# Patient Record
Sex: MALE | Race: WHITE
[De-identification: names, ages, dates, MRNs, and addresses within clinical notes are randomized per-mention and may not be internally consistent; named-entity substitution may affect disease eponyms.]

---

## 2019-11-01 PROBLEM — Z98.890 S/P PARS PLANA VITRECTOMY (PPV): Noted: 2019-11-01

## 2020-07-10 ENCOUNTER — NEW PATIENT (OUTPATIENT)
Age: 65
End: 2020-07-10

## 2020-07-10 DIAGNOSIS — E11.9: ICD-10-CM

## 2020-07-10 DIAGNOSIS — H25.12: ICD-10-CM

## 2020-07-10 PROCEDURE — 92025 CPTRIZED CORNEAL TOPOGRAPHY: CPT

## 2020-07-10 PROCEDURE — 99204 OFFICE O/P NEW MOD 45 MIN: CPT

## 2020-07-10 ASSESSMENT — TONOMETRY
OD_IOP_MMHG: 18
OS_IOP_MMHG: 19

## 2020-07-10 ASSESSMENT — VISUAL ACUITY
OS_CC: CF 4FT
OD_CC: J1
OD_CC: 20/20
OS_PH: 20/150
OS_CC: J16
OD_GLARE: 20/20

## 2020-07-10 ASSESSMENT — KERATOMETRY
OD_AXISANGLE_DEGREES: 167
OD_AXISANGLE2_DEGREES: 77
OS_AXISANGLE2_DEGREES: 100
OS_K2POWER_DIOPTERS: 45
OD_K2POWER_DIOPTERS: 44.75
OS_K1POWER_DIOPTERS: 44.25
OD_K1POWER_DIOPTERS: 44
OS_AXISANGLE_DEGREES: 10

## 2020-08-06 ENCOUNTER — DIAGNOSTICS ONLY (OUTPATIENT)
Age: 65
End: 2020-08-06

## 2020-08-06 DIAGNOSIS — E11.9: ICD-10-CM

## 2020-08-06 DIAGNOSIS — H25.12: ICD-10-CM

## 2020-08-06 PROCEDURE — 92136 OPHTHALMIC BIOMETRY: CPT

## 2020-08-06 PROCEDURE — 92025 CPTRIZED CORNEAL TOPOGRAPHY: CPT

## 2020-08-06 PROCEDURE — 92134 CPTRZ OPH DX IMG PST SGM RTA: CPT

## 2020-08-06 ASSESSMENT — KERATOMETRY
OD_AXISANGLE_DEGREES: 167
OS_K2POWER_DIOPTERS: 45
OD_K1POWER_DIOPTERS: 44
OD_K2POWER_DIOPTERS: 44.75
OD_AXISANGLE2_DEGREES: 77
OS_AXISANGLE_DEGREES: 10
OS_AXISANGLE2_DEGREES: 100
OS_K1POWER_DIOPTERS: 44.25

## 2020-08-18 ENCOUNTER — SURGERY/PROCEDURE (OUTPATIENT)
Age: 65
End: 2020-08-18

## 2020-08-18 DIAGNOSIS — H25.12: ICD-10-CM

## 2020-08-18 DIAGNOSIS — H52.222: ICD-10-CM

## 2020-08-18 PROCEDURE — 66984 XCAPSL CTRC RMVL W/O ECP: CPT

## 2020-08-18 ASSESSMENT — KERATOMETRY
OS_AXISANGLE_DEGREES: 10
OS_K2POWER_DIOPTERS: 45
OS_AXISANGLE2_DEGREES: 100
OD_K1POWER_DIOPTERS: 44
OD_AXISANGLE_DEGREES: 167
OD_AXISANGLE2_DEGREES: 77
OD_K2POWER_DIOPTERS: 44.75
OS_K1POWER_DIOPTERS: 44.25

## 2020-08-20 ENCOUNTER — POST-OP CHECK (OUTPATIENT)
Age: 65
End: 2020-08-20

## 2020-08-20 DIAGNOSIS — Z96.1: ICD-10-CM

## 2020-08-20 PROCEDURE — 99024 POSTOP FOLLOW-UP VISIT: CPT

## 2020-08-20 ASSESSMENT — TONOMETRY: OS_IOP_MMHG: 10

## 2020-08-20 ASSESSMENT — VISUAL ACUITY: OS_SC: 20/150

## 2020-08-27 ENCOUNTER — POST-OP CHECK (OUTPATIENT)
Age: 65
End: 2020-08-27

## 2020-08-27 DIAGNOSIS — Z96.1: ICD-10-CM

## 2020-08-27 PROCEDURE — 99024 POSTOP FOLLOW-UP VISIT: CPT

## 2020-08-27 ASSESSMENT — VISUAL ACUITY
OS_SC: 20/200
OS_SC: J1+

## 2020-08-27 ASSESSMENT — KERATOMETRY
OS_AXISANGLE_DEGREES: 23
OS_AXISANGLE2_DEGREES: 113
OS_K1POWER_DIOPTERS: 44.25
OS_K2POWER_DIOPTERS: 44.75

## 2020-08-27 ASSESSMENT — TONOMETRY: OS_IOP_MMHG: 16

## 2020-09-18 ENCOUNTER — POST-OP CHECK (OUTPATIENT)
Age: 65
End: 2020-09-18

## 2020-09-18 DIAGNOSIS — Z96.1: ICD-10-CM

## 2020-09-18 PROCEDURE — 99024 POSTOP FOLLOW-UP VISIT: CPT

## 2020-09-18 ASSESSMENT — KERATOMETRY
OS_K1POWER_DIOPTERS: 44.5
OS_K2POWER_DIOPTERS: 45
OS_AXISANGLE_DEGREES: 177
OS_AXISANGLE2_DEGREES: 113
OS_AXISANGLE_DEGREES: 23
OS_K1POWER_DIOPTERS: 44.25
OS_AXISANGLE2_DEGREES: 87
OS_K2POWER_DIOPTERS: 44.75

## 2020-09-18 ASSESSMENT — TONOMETRY: OS_IOP_MMHG: 13

## 2020-09-18 ASSESSMENT — VISUAL ACUITY
OS_SC: 20/150
OS_SC: J1+
OS_PH: 20/30

## 2021-12-14 ENCOUNTER — PREPPED CHART (OUTPATIENT)
Dept: URBAN - METROPOLITAN AREA CLINIC 101 | Facility: CLINIC | Age: 66
End: 2021-12-14

## 2021-12-14 DIAGNOSIS — H43.811: ICD-10-CM

## 2021-12-14 DIAGNOSIS — H33.322: ICD-10-CM

## 2021-12-14 DIAGNOSIS — H35.372: ICD-10-CM

## 2021-12-14 DIAGNOSIS — E11.9: ICD-10-CM

## 2021-12-14 PROBLEM — H25.11 NS CATARACT: Noted: 2021-12-14 | Resolved: 2021-12-14

## 2021-12-14 PROBLEM — H25.11 NS CATARACT: Noted: 2021-12-14

## 2021-12-14 PROCEDURE — 92201 OPSCPY EXTND RTA DRAW UNI/BI: CPT

## 2021-12-14 PROCEDURE — 92014 COMPRE OPH EXAM EST PT 1/>: CPT

## 2021-12-14 PROCEDURE — 92134 CPTRZ OPH DX IMG PST SGM RTA: CPT

## 2022-05-26 ASSESSMENT — VISUAL ACUITY
OS_CC: 20/20
OD_CC: 20/20-2

## 2022-05-26 ASSESSMENT — TONOMETRY
OS_IOP_MMHG: 16
OD_IOP_MMHG: 18

## 2022-06-17 ENCOUNTER — FOLLOW UP (OUTPATIENT)
Dept: URBAN - METROPOLITAN AREA CLINIC 101 | Facility: CLINIC | Age: 67
End: 2022-06-17

## 2022-06-17 DIAGNOSIS — H35.372: ICD-10-CM

## 2022-06-17 DIAGNOSIS — H33.322: ICD-10-CM

## 2022-06-17 DIAGNOSIS — E11.9: ICD-10-CM

## 2022-06-17 DIAGNOSIS — H43.811: ICD-10-CM

## 2022-06-17 PROCEDURE — 99214 OFFICE O/P EST MOD 30 MIN: CPT

## 2022-06-17 PROCEDURE — 92201 OPSCPY EXTND RTA DRAW UNI/BI: CPT

## 2022-06-17 PROCEDURE — 92134 CPTRZ OPH DX IMG PST SGM RTA: CPT

## 2022-06-17 ASSESSMENT — TONOMETRY
OD_IOP_MMHG: 17
OS_IOP_MMHG: 18

## 2022-06-17 ASSESSMENT — VISUAL ACUITY
OS_CC: 20/25-1
OD_CC: 20/20-2

## 2022-12-02 ENCOUNTER — FOLLOW UP (OUTPATIENT)
Dept: URBAN - METROPOLITAN AREA CLINIC 101 | Facility: CLINIC | Age: 67
End: 2022-12-02

## 2022-12-02 DIAGNOSIS — H25.11: ICD-10-CM

## 2022-12-02 DIAGNOSIS — H35.372: ICD-10-CM

## 2022-12-02 DIAGNOSIS — H33.322: ICD-10-CM

## 2022-12-02 DIAGNOSIS — H43.811: ICD-10-CM

## 2022-12-02 DIAGNOSIS — E11.9: ICD-10-CM

## 2022-12-02 PROCEDURE — 92201 OPSCPY EXTND RTA DRAW UNI/BI: CPT

## 2022-12-02 PROCEDURE — 92134 CPTRZ OPH DX IMG PST SGM RTA: CPT

## 2022-12-02 PROCEDURE — 92014 COMPRE OPH EXAM EST PT 1/>: CPT

## 2022-12-02 ASSESSMENT — VISUAL ACUITY
OD_CC: 20/20-2
OS_CC: 20/20

## 2022-12-02 ASSESSMENT — TONOMETRY
OD_IOP_MMHG: 18
OS_IOP_MMHG: 18

## 2023-06-09 ENCOUNTER — FOLLOW UP (OUTPATIENT)
Dept: URBAN - METROPOLITAN AREA CLINIC 101 | Facility: CLINIC | Age: 68
End: 2023-06-09

## 2023-06-09 DIAGNOSIS — Z96.1: ICD-10-CM

## 2023-06-09 DIAGNOSIS — H35.372: ICD-10-CM

## 2023-06-09 DIAGNOSIS — H43.811: ICD-10-CM

## 2023-06-09 DIAGNOSIS — H25.11: ICD-10-CM

## 2023-06-09 DIAGNOSIS — H33.322: ICD-10-CM

## 2023-06-09 DIAGNOSIS — E11.9: ICD-10-CM

## 2023-06-09 PROCEDURE — 92014 COMPRE OPH EXAM EST PT 1/>: CPT

## 2023-06-09 PROCEDURE — 92134 CPTRZ OPH DX IMG PST SGM RTA: CPT

## 2023-06-09 PROCEDURE — 92201 OPSCPY EXTND RTA DRAW UNI/BI: CPT

## 2023-06-09 ASSESSMENT — TONOMETRY
OD_IOP_MMHG: 19
OS_IOP_MMHG: 17

## 2023-06-09 ASSESSMENT — VISUAL ACUITY
OS_CC: 20/20
OD_PH: 20/20
OD_CC: 20/25-1

## 2023-09-11 ENCOUNTER — APPOINTMENT (OUTPATIENT)
Age: 68
Setting detail: DERMATOLOGY
End: 2023-09-11

## 2023-09-11 DIAGNOSIS — L82.1 OTHER SEBORRHEIC KERATOSIS: ICD-10-CM

## 2023-09-11 DIAGNOSIS — L81.4 OTHER MELANIN HYPERPIGMENTATION: ICD-10-CM

## 2023-09-11 DIAGNOSIS — Z71.89 OTHER SPECIFIED COUNSELING: ICD-10-CM

## 2023-09-11 DIAGNOSIS — L57.0 ACTINIC KERATOSIS: ICD-10-CM

## 2023-09-11 DIAGNOSIS — L82.0 INFLAMED SEBORRHEIC KERATOSIS: ICD-10-CM

## 2023-09-11 DIAGNOSIS — D22 MELANOCYTIC NEVI: ICD-10-CM

## 2023-09-11 DIAGNOSIS — D18.0 HEMANGIOMA: ICD-10-CM

## 2023-09-11 DIAGNOSIS — B07.8 OTHER VIRAL WARTS: ICD-10-CM

## 2023-09-11 DIAGNOSIS — L73.8 OTHER SPECIFIED FOLLICULAR DISORDERS: ICD-10-CM

## 2023-09-11 PROBLEM — D22.5 MELANOCYTIC NEVI OF TRUNK: Status: ACTIVE | Noted: 2023-09-11

## 2023-09-11 PROBLEM — D22.61 MELANOCYTIC NEVI OF RIGHT UPPER LIMB, INCLUDING SHOULDER: Status: ACTIVE | Noted: 2023-09-11

## 2023-09-11 PROBLEM — D22.4 MELANOCYTIC NEVI OF SCALP AND NECK: Status: ACTIVE | Noted: 2023-09-11

## 2023-09-11 PROBLEM — D18.01 HEMANGIOMA OF SKIN AND SUBCUTANEOUS TISSUE: Status: ACTIVE | Noted: 2023-09-11

## 2023-09-11 PROBLEM — D22.39 MELANOCYTIC NEVI OF OTHER PARTS OF FACE: Status: ACTIVE | Noted: 2023-09-11

## 2023-09-11 PROBLEM — D22.62 MELANOCYTIC NEVI OF LEFT UPPER LIMB, INCLUDING SHOULDER: Status: ACTIVE | Noted: 2023-09-11

## 2023-09-11 PROCEDURE — ? COUNSELING

## 2023-09-11 PROCEDURE — 99203 OFFICE O/P NEW LOW 30 MIN: CPT | Mod: 25

## 2023-09-11 PROCEDURE — 17110 DESTRUCTION B9 LES UP TO 14: CPT

## 2023-09-11 PROCEDURE — 17003 DESTRUCT PREMALG LES 2-14: CPT | Mod: 59

## 2023-09-11 PROCEDURE — ? LIQUID NITROGEN

## 2023-09-11 PROCEDURE — 17000 DESTRUCT PREMALG LESION: CPT | Mod: 59

## 2023-09-11 PROCEDURE — ? BENIGN DESTRUCTION

## 2023-09-11 PROCEDURE — ? SUNSCREEN RECOMMENDATIONS

## 2023-09-11 ASSESSMENT — LOCATION SIMPLE DESCRIPTION DERM
LOCATION SIMPLE: LEFT FOREARM
LOCATION SIMPLE: LEFT CHEEK
LOCATION SIMPLE: LEFT POSTERIOR UPPER ARM
LOCATION SIMPLE: RIGHT FOREHEAD
LOCATION SIMPLE: RIGHT POSTERIOR UPPER ARM
LOCATION SIMPLE: RIGHT SCALP
LOCATION SIMPLE: RIGHT HAND
LOCATION SIMPLE: LEFT HAND
LOCATION SIMPLE: LEFT SUPERIOR EYELID
LOCATION SIMPLE: RIGHT EAR
LOCATION SIMPLE: RIGHT SUPERIOR EYELID
LOCATION SIMPLE: LEFT FOREHEAD
LOCATION SIMPLE: RIGHT UPPER BACK

## 2023-09-11 ASSESSMENT — LOCATION ZONE DERM
LOCATION ZONE: EYELID
LOCATION ZONE: TRUNK
LOCATION ZONE: HAND
LOCATION ZONE: EAR
LOCATION ZONE: FACE
LOCATION ZONE: SCALP
LOCATION ZONE: ARM

## 2023-09-11 ASSESSMENT — LOCATION DETAILED DESCRIPTION DERM
LOCATION DETAILED: RIGHT MEDIAL FRONTAL SCALP
LOCATION DETAILED: RIGHT CENTRAL FRONTAL SCALP
LOCATION DETAILED: LEFT PROXIMAL DORSAL FOREARM
LOCATION DETAILED: LEFT PROXIMAL POSTERIOR UPPER ARM
LOCATION DETAILED: LEFT PROXIMAL RADIAL DORSAL FOREARM
LOCATION DETAILED: RIGHT RADIAL DORSAL HAND
LOCATION DETAILED: LEFT FOREHEAD
LOCATION DETAILED: LEFT MEDIAL FOREHEAD
LOCATION DETAILED: RIGHT PROXIMAL POSTERIOR UPPER ARM
LOCATION DETAILED: LEFT LATERAL SUPERIOR EYELID
LOCATION DETAILED: RIGHT SUPERIOR CRUS OF ANTIHELIX
LOCATION DETAILED: LEFT RADIAL DORSAL HAND
LOCATION DETAILED: RIGHT LATERAL SUPERIOR EYELID
LOCATION DETAILED: RIGHT SUPERIOR UPPER BACK
LOCATION DETAILED: LEFT SUPERIOR CENTRAL MALAR CHEEK
LOCATION DETAILED: RIGHT MEDIAL FOREHEAD

## 2023-09-11 NOTE — PROCEDURE: LIQUID NITROGEN
Show Applicator Variable?: Yes
Render Note In Bullet Format When Appropriate: No
Spray Paint Text: The liquid nitrogen was applied to the skin utilizing a spray paint frosting technique.
Medical Necessity Information: It is in your best interest to select a reason for this procedure from the list below. All of these items fulfill various CMS LCD requirements except the new and changing color options.
Medical Necessity Clause: This procedure was medically necessary because the lesions that were treated were:
Aperture Size (Optional): C
Post-Care Instructions: I reviewed with the patient in detail post-care instructions. Patient is to wear sunprotection, and avoid picking at any of the treated lesions. Pt may apply Vaseline to crusted or scabbing areas.
Detail Level: Simple
Consent: The patient's consent was obtained including but not limited to risks of crusting, scabbing, blistering, scarring, darker or lighter pigmentary change, recurrence, incomplete removal and infection.
Post-Care Instructions: I reviewed with the patient in detail post-care instructions. Pt. to  avoid picking at any of the treated lesions. Pt may apply Vaseline to crusted or scabbing areas. If cantharone was used in treatment, it should be washed off in 3-6 hours.
Detail Level: Detailed
Number Of Freeze-Thaw Cycles: 1 freeze-thaw cycle
Duration Of Freeze Thaw-Cycle (Seconds): 5-10
Application Tool (Optional): Liquid Nitrogen Sprayer
Duration Of Freeze Thaw-Cycle (Seconds): 0
Application Tool (Optional): Cry-AC

## 2023-09-11 NOTE — PROCEDURE: BENIGN DESTRUCTION
Anesthesia Volume In Cc: 0.5
Post-Care Instructions: I reviewed with the patient in detail post-care instructions. Patient is to wear sunprotection, and avoid picking at any of the treated lesions. Pt may apply Vaseline to crusted or scabbing areas.
Render Post-Care Instructions In Note?: no
Consent: The patient's consent was obtained including but not limited to risks of crusting, scabbing, blistering, scarring, darker or lighter pigmentary change, recurrence, incomplete removal and infection.
Detail Level: Zone
Medical Necessity Clause: This procedure was medically necessary because the lesions that were treated were:
Medical Necessity Information: It is in your best interest to select a reason for this procedure from the list below. All of these items fulfill various CMS LCD requirements except the new and changing color options.
Total Number Of Lesions Treated: 3

## 2023-12-08 ENCOUNTER — FOLLOW UP (OUTPATIENT)
Dept: URBAN - METROPOLITAN AREA CLINIC 101 | Facility: CLINIC | Age: 68
End: 2023-12-08

## 2023-12-08 DIAGNOSIS — E11.9: ICD-10-CM

## 2023-12-08 DIAGNOSIS — H43.811: ICD-10-CM

## 2023-12-08 DIAGNOSIS — H35.372: ICD-10-CM

## 2023-12-08 DIAGNOSIS — Z96.1: ICD-10-CM

## 2023-12-08 DIAGNOSIS — H25.11: ICD-10-CM

## 2023-12-08 DIAGNOSIS — H33.322: ICD-10-CM

## 2023-12-08 PROCEDURE — 92201 OPSCPY EXTND RTA DRAW UNI/BI: CPT

## 2023-12-08 PROCEDURE — 92014 COMPRE OPH EXAM EST PT 1/>: CPT

## 2023-12-08 PROCEDURE — 92134 CPTRZ OPH DX IMG PST SGM RTA: CPT

## 2023-12-08 ASSESSMENT — VISUAL ACUITY
OS_CC: 20/25-1
OD_CC: 20/20

## 2023-12-08 ASSESSMENT — TONOMETRY
OD_IOP_MMHG: 17
OS_IOP_MMHG: 17

## 2024-05-01 ENCOUNTER — PROBLEM (OUTPATIENT)
Dept: URBAN - METROPOLITAN AREA CLINIC 101 | Facility: CLINIC | Age: 69
End: 2024-05-01

## 2024-05-01 DIAGNOSIS — Z96.1: ICD-10-CM

## 2024-05-01 DIAGNOSIS — E11.9: ICD-10-CM

## 2024-05-01 DIAGNOSIS — H43.811: ICD-10-CM

## 2024-05-01 DIAGNOSIS — H25.11: ICD-10-CM

## 2024-05-01 DIAGNOSIS — H35.372: ICD-10-CM

## 2024-05-01 DIAGNOSIS — H33.322: ICD-10-CM

## 2024-05-01 DIAGNOSIS — H26.492: ICD-10-CM

## 2024-05-01 PROCEDURE — 92134 CPTRZ OPH DX IMG PST SGM RTA: CPT

## 2024-05-01 PROCEDURE — 92014 COMPRE OPH EXAM EST PT 1/>: CPT

## 2024-05-01 PROCEDURE — 92201 OPSCPY EXTND RTA DRAW UNI/BI: CPT

## 2024-05-01 ASSESSMENT — VISUAL ACUITY
OS_CC: 20/30
OD_CC: 20/20

## 2024-05-01 ASSESSMENT — TONOMETRY
OD_IOP_MMHG: 17
OS_IOP_MMHG: 16

## 2024-06-10 ENCOUNTER — NEW PATIENT COMPREHENSIVE (OUTPATIENT)
Dept: URBAN - METROPOLITAN AREA CLINIC 45 | Facility: CLINIC | Age: 69
End: 2024-06-10

## 2024-06-10 DIAGNOSIS — H25.12: ICD-10-CM

## 2024-06-10 DIAGNOSIS — H25.11: ICD-10-CM

## 2024-06-10 DIAGNOSIS — H26.492: ICD-10-CM

## 2024-06-10 PROCEDURE — 99214 OFFICE O/P EST MOD 30 MIN: CPT

## 2024-06-10 ASSESSMENT — VISUAL ACUITY
OS_CC: 20/40
OS_GLARE: 20/400
OD_CC: 20/20

## 2024-06-19 ENCOUNTER — CLINIC PROCEDURE ONLY (OUTPATIENT)
Dept: URBAN - METROPOLITAN AREA CLINIC 45 | Facility: CLINIC | Age: 69
End: 2024-06-19

## 2024-06-19 DIAGNOSIS — H26.492: ICD-10-CM

## 2024-06-19 PROCEDURE — 66821 AFTER CATARACT LASER SURGERY: CPT

## 2025-05-16 ENCOUNTER — 6 MONTH FOLLOW UP (OUTPATIENT)
Dept: URBAN - METROPOLITAN AREA CLINIC 101 | Facility: CLINIC | Age: 70
End: 2025-05-16

## 2025-05-16 DIAGNOSIS — H33.322: ICD-10-CM

## 2025-05-16 DIAGNOSIS — E11.9: ICD-10-CM

## 2025-05-16 DIAGNOSIS — H43.811: ICD-10-CM

## 2025-05-16 DIAGNOSIS — H25.11: ICD-10-CM

## 2025-05-16 DIAGNOSIS — H35.372: ICD-10-CM

## 2025-05-16 PROCEDURE — 92014 COMPRE OPH EXAM EST PT 1/>: CPT

## 2025-05-16 PROCEDURE — 92134 CPTRZ OPH DX IMG PST SGM RTA: CPT

## 2025-05-16 PROCEDURE — 92201 OPSCPY EXTND RTA DRAW UNI/BI: CPT

## 2025-05-16 ASSESSMENT — TONOMETRY
OS_IOP_MMHG: 14
OD_IOP_MMHG: 13

## 2025-05-16 ASSESSMENT — VISUAL ACUITY
OS_CC: 20/20
OD_CC: 20/20